# Patient Record
Sex: FEMALE | HISPANIC OR LATINO | URBAN - METROPOLITAN AREA
[De-identification: names, ages, dates, MRNs, and addresses within clinical notes are randomized per-mention and may not be internally consistent; named-entity substitution may affect disease eponyms.]

---

## 2024-08-28 ENCOUNTER — TELEPHONE ENCOUNTER (OUTPATIENT)
Dept: URBAN - METROPOLITAN AREA CLINIC 60 | Facility: CLINIC | Age: 69
End: 2024-08-28

## 2024-08-28 ENCOUNTER — OFFICE VISIT (OUTPATIENT)
Dept: URBAN - METROPOLITAN AREA CLINIC 60 | Facility: CLINIC | Age: 69
End: 2024-08-28
Payer: MEDICARE

## 2024-08-28 ENCOUNTER — DASHBOARD ENCOUNTERS (OUTPATIENT)
Age: 69
End: 2024-08-28

## 2024-08-28 VITALS
HEIGHT: 64 IN | WEIGHT: 198 LBS | HEART RATE: 80 BPM | OXYGEN SATURATION: 96 % | RESPIRATION RATE: 20 BRPM | TEMPERATURE: 97.5 F | DIASTOLIC BLOOD PRESSURE: 70 MMHG | BODY MASS INDEX: 33.8 KG/M2 | SYSTOLIC BLOOD PRESSURE: 120 MMHG

## 2024-08-28 DIAGNOSIS — K31.A0 GASTRIC INTESTINAL METAPLASIA: ICD-10-CM

## 2024-08-28 DIAGNOSIS — Z12.11 COLON CANCER SCREENING: ICD-10-CM

## 2024-08-28 DIAGNOSIS — K21.9 GASTROESOPHAGEAL REFLUX DISEASE WITHOUT ESOPHAGITIS: ICD-10-CM

## 2024-08-28 PROBLEM — 266435005: Status: ACTIVE | Noted: 2024-08-28

## 2024-08-28 PROCEDURE — 99204 OFFICE O/P NEW MOD 45 MIN: CPT | Performed by: NURSE PRACTITIONER

## 2024-08-28 RX ORDER — LOSARTAN POTASSIUM 50 MG/1
TAKE 1 TABLET BY MOUTH DAILY IN THE MORNING TABLET, FILM COATED ORAL
Qty: 90 EACH | Refills: 0 | Status: ACTIVE | COMMUNITY

## 2024-08-28 RX ORDER — METFORMIN HYDROCHLORIDE 500 MG/1
TAKE 1 TABLET BY MOUTH TWICE DAILY WITH MAIN MEALS TABLET, FILM COATED ORAL
Qty: 180 EACH | Refills: 0 | Status: ACTIVE | COMMUNITY

## 2024-08-28 RX ORDER — PREDNISONE 5 MG/1
TABLET ORAL
Qty: 70 EACH | Refills: 0 | Status: ACTIVE | COMMUNITY

## 2024-08-28 RX ORDER — DICLOFENAC SODIUM 10 MG/G
GEL TOPICAL
Qty: 200 GRAM | Status: ACTIVE | COMMUNITY

## 2024-08-28 RX ORDER — TRAZODONE HYDROCHLORIDE 50 MG/1
TABLET ORAL
Qty: 90 TABLET | Status: ACTIVE | COMMUNITY

## 2024-08-28 RX ORDER — GABAPENTIN 300 MG/1
TAKE 1 CAPSULE BY MOUTH AT BEDTIME CAPSULE ORAL
Qty: 90 EACH | Refills: 0 | Status: ACTIVE | COMMUNITY

## 2024-08-28 RX ORDER — FOLIC ACID 1 MG/1
TABLET ORAL
Qty: 90 TABLET | Status: ACTIVE | COMMUNITY

## 2024-08-28 RX ORDER — ETANERCEPT 50 MG/ML
SOLUTION SUBCUTANEOUS
Qty: 4 MILLILITER | Status: ACTIVE | COMMUNITY

## 2024-08-28 RX ORDER — MECLIZINE HYDROCHLORIDE 25 MG/1
TABLET ORAL
Qty: 30 TABLET | Status: ACTIVE | COMMUNITY

## 2024-08-28 RX ORDER — OMEPRAZOLE 20 MG/1
1 CAPSULE 30 MINUTES BEFORE MORNING MEAL CAPSULE, DELAYED RELEASE ORAL ONCE A DAY
Qty: 90 CAPSULES | Refills: 0 | OUTPATIENT
Start: 2024-08-28

## 2024-08-28 RX ORDER — TIZANIDINE 4 MG/1
TABLET ORAL
Qty: 90 TABLET | Status: ACTIVE | COMMUNITY

## 2024-08-28 RX ORDER — ATORVASTATIN CALCIUM 10 MG/1
TAKE 1 TABLET BY MOUTH DAILY TABLET, FILM COATED ORAL
Qty: 90 EACH | Refills: 0 | Status: ACTIVE | COMMUNITY

## 2024-08-28 RX ORDER — IBUPROFEN 600 MG/1
TABLET, FILM COATED ORAL
Qty: 30 TABLET | Status: ACTIVE | COMMUNITY

## 2024-08-28 RX ORDER — ENALAPRIL MALEATE 20 MG/1
TAKE 1 TABLET BY MOUTH EVERY DAY TABLET ORAL
Qty: 90 EACH | Refills: 0 | Status: ACTIVE | COMMUNITY

## 2024-08-28 RX ORDER — SERTRALINE HYDROCHLORIDE 25 MG/1
TAKE 1 TABLET BY MOUTH DAILY IN THE MORNING TABLET, FILM COATED ORAL
Qty: 90 EACH | Refills: 0 | Status: ACTIVE | COMMUNITY

## 2024-08-28 RX ORDER — TRIAMCINOLONE ACETONIDE 1 MG/G
APPLY TOPICALLY TO AFFECTED AREA TWICE DAILY CREAM TOPICAL
Qty: 454 GRAM | Refills: 0 | Status: ACTIVE | COMMUNITY

## 2024-08-28 RX ORDER — OXYCODONE HYDROCHLORIDE AND ACETAMINOPHEN 5; 325 MG/1; MG/1
TABLET ORAL
Qty: 10 EACH | Refills: 0 | Status: ACTIVE | COMMUNITY

## 2024-08-28 NOTE — HPI-TODAY'S VISIT:
8/24 Patient is here today with general state.  She is here for her surveillance EGD and colonoscopy.  Patient last colonoscopy was done on 2013 and also her last EGD.  She has medical history of gastric intestinal metaplasia, negative for colon polyps, patient denies any personal family history of colorectal cancer, rectal bleeding or change in her bowel habits. Patient will resume diet for gastritis and reflux, omeprazole 20 mg daily, and  EGD. Colonoscopy Mustarde Flap Text: The defect edges were debeveled with a #15 scalpel blade.  Given the size, depth and location of the defect and the proximity to free margins a Mustarde flap was deemed most appropriate.  Using a sterile surgical marker, an appropriate flap was drawn incorporating the defect. The area thus outlined was incised with a #15 scalpel blade.  The skin margins were undermined to an appropriate distance in all directions utilizing iris scissors.

## 2024-09-04 ENCOUNTER — LAB OUTSIDE AN ENCOUNTER (OUTPATIENT)
Dept: URBAN - METROPOLITAN AREA CLINIC 60 | Facility: CLINIC | Age: 69
End: 2024-09-04

## 2024-09-05 ENCOUNTER — OFFICE VISIT (OUTPATIENT)
Dept: URBAN - METROPOLITAN AREA CLINIC 63 | Facility: CLINIC | Age: 69
End: 2024-09-05

## 2024-11-11 ENCOUNTER — TELEPHONE ENCOUNTER (OUTPATIENT)
Dept: URBAN - METROPOLITAN AREA CLINIC 60 | Facility: CLINIC | Age: 69
End: 2024-11-11

## 2024-11-14 ENCOUNTER — CLAIMS CREATED FROM THE CLAIM WINDOW (OUTPATIENT)
Dept: URBAN - METROPOLITAN AREA SURGERY CENTER 4 | Facility: SURGERY CENTER | Age: 69
End: 2024-11-14
Payer: COMMERCIAL

## 2024-11-14 ENCOUNTER — CLAIMS CREATED FROM THE CLAIM WINDOW (OUTPATIENT)
Dept: URBAN - METROPOLITAN AREA CLINIC 4 | Facility: CLINIC | Age: 69
End: 2024-11-14
Payer: COMMERCIAL

## 2024-11-14 DIAGNOSIS — K29.60 OTHER GASTRITIS WITHOUT BLEEDING: ICD-10-CM

## 2024-11-14 DIAGNOSIS — K22.89 OTHER SPECIFIED DISEASE OF ESOPHAGUS: ICD-10-CM

## 2024-11-14 DIAGNOSIS — Z12.11 ENCOUNTER FOR SCREENING FOR MALIGNANT NEOPLASM OF COLON: ICD-10-CM

## 2024-11-14 DIAGNOSIS — B96.81 HELICOBACTER PYLORI [H. PYLORI] AS THE CAUSE OF DISEASES CLASSIFIED ELSEWHERE: ICD-10-CM

## 2024-11-14 DIAGNOSIS — K31.89 OTHER DISEASES OF STOMACH AND DUODENUM: ICD-10-CM

## 2024-11-14 DIAGNOSIS — K44.9 DIAPHRAGMATIC HERNIA WITHOUT OBSTRUCTION OR GANGRENE: ICD-10-CM

## 2024-11-14 DIAGNOSIS — K44.9 HIATAL HERNIA: ICD-10-CM

## 2024-11-14 DIAGNOSIS — K64.1 SECOND DEGREE HEMORRHOIDS: ICD-10-CM

## 2024-11-14 DIAGNOSIS — K64.1 GRADE II INTERNAL HEMORRHOIDS: ICD-10-CM

## 2024-11-14 DIAGNOSIS — K29.70 GASTRITIS WITHOUT BLEEDING, UNSPECIFIED CHRONICITY, UNSPECIFIED GASTRITIS TYPE: ICD-10-CM

## 2024-11-14 DIAGNOSIS — K29.70 ERYTHEMATOUS GASTROPATHY: ICD-10-CM

## 2024-11-14 DIAGNOSIS — K21.9 GASTRO-ESOPHAGEAL REFLUX DISEASE WITHOUT ESOPHAGITIS: ICD-10-CM

## 2024-11-14 DIAGNOSIS — K57.30 DIVERTICULOSIS OF LARGE INTESTINE WITHOUT PERFORATION OR ABSCESS WITHOUT BLEEDING: ICD-10-CM

## 2024-11-14 PROCEDURE — 0528F RCMND FLW-UP 10 YRS DOCD: CPT | Performed by: INTERNAL MEDICINE

## 2024-11-14 PROCEDURE — 88312 SPECIAL STAINS GROUP 1: CPT | Performed by: PATHOLOGY

## 2024-11-14 PROCEDURE — 43239 EGD BIOPSY SINGLE/MULTIPLE: CPT | Performed by: INTERNAL MEDICINE

## 2024-11-14 PROCEDURE — 00813 ANES UPR LWR GI NDSC PX: CPT | Performed by: NURSE ANESTHETIST, CERTIFIED REGISTERED

## 2024-11-14 PROCEDURE — 88305 TISSUE EXAM BY PATHOLOGIST: CPT | Performed by: PATHOLOGY

## 2024-11-14 PROCEDURE — G0121 COLON CA SCRN NOT HI RSK IND: HCPCS | Performed by: INTERNAL MEDICINE

## 2024-11-14 RX ORDER — PREDNISONE 5 MG/1
TABLET ORAL
Qty: 70 EACH | Refills: 0 | Status: ACTIVE | COMMUNITY

## 2024-11-14 RX ORDER — OXYCODONE HYDROCHLORIDE AND ACETAMINOPHEN 5; 325 MG/1; MG/1
TABLET ORAL
Qty: 10 EACH | Refills: 0 | Status: ACTIVE | COMMUNITY

## 2024-11-14 RX ORDER — SERTRALINE HYDROCHLORIDE 25 MG/1
TAKE 1 TABLET BY MOUTH DAILY IN THE MORNING TABLET, FILM COATED ORAL
Qty: 90 EACH | Refills: 0 | Status: ACTIVE | COMMUNITY

## 2024-11-14 RX ORDER — METFORMIN HYDROCHLORIDE 500 MG/1
TAKE 1 TABLET BY MOUTH TWICE DAILY WITH MAIN MEALS TABLET, FILM COATED ORAL
Qty: 180 EACH | Refills: 0 | Status: ACTIVE | COMMUNITY

## 2024-11-14 RX ORDER — DICLOFENAC SODIUM 10 MG/G
GEL TOPICAL
Qty: 200 GRAM | Status: ACTIVE | COMMUNITY

## 2024-11-14 RX ORDER — GABAPENTIN 300 MG/1
TAKE 1 CAPSULE BY MOUTH AT BEDTIME CAPSULE ORAL
Qty: 90 EACH | Refills: 0 | Status: ACTIVE | COMMUNITY

## 2024-11-14 RX ORDER — IBUPROFEN 600 MG/1
TABLET, FILM COATED ORAL
Qty: 30 TABLET | Status: ACTIVE | COMMUNITY

## 2024-11-14 RX ORDER — TRAZODONE HYDROCHLORIDE 50 MG/1
TABLET ORAL
Qty: 90 TABLET | Status: ACTIVE | COMMUNITY

## 2024-11-14 RX ORDER — MECLIZINE HYDROCHLORIDE 25 MG/1
TABLET ORAL
Qty: 30 TABLET | Status: ACTIVE | COMMUNITY

## 2024-11-14 RX ORDER — ETANERCEPT 50 MG/ML
SOLUTION SUBCUTANEOUS
Qty: 4 MILLILITER | Status: ACTIVE | COMMUNITY

## 2024-11-14 RX ORDER — FOLIC ACID 1 MG/1
TABLET ORAL
Qty: 90 TABLET | Status: ACTIVE | COMMUNITY

## 2024-11-14 RX ORDER — TIZANIDINE 4 MG/1
TABLET ORAL
Qty: 90 TABLET | Status: ACTIVE | COMMUNITY

## 2024-11-14 RX ORDER — ENALAPRIL MALEATE 20 MG/1
TAKE 1 TABLET BY MOUTH EVERY DAY TABLET ORAL
Qty: 90 EACH | Refills: 0 | Status: ACTIVE | COMMUNITY

## 2024-11-14 RX ORDER — TRIAMCINOLONE ACETONIDE 1 MG/G
APPLY TOPICALLY TO AFFECTED AREA TWICE DAILY CREAM TOPICAL
Qty: 454 GRAM | Refills: 0 | Status: ACTIVE | COMMUNITY

## 2024-11-14 RX ORDER — LOSARTAN POTASSIUM 50 MG/1
TAKE 1 TABLET BY MOUTH DAILY IN THE MORNING TABLET, FILM COATED ORAL
Qty: 90 EACH | Refills: 0 | Status: ACTIVE | COMMUNITY

## 2024-11-14 RX ORDER — OMEPRAZOLE 20 MG/1
1 CAPSULE 30 MINUTES BEFORE MORNING MEAL CAPSULE, DELAYED RELEASE ORAL ONCE A DAY
Qty: 90 CAPSULES | Refills: 0 | Status: ACTIVE | COMMUNITY
Start: 2024-08-28

## 2024-11-14 RX ORDER — ATORVASTATIN CALCIUM 10 MG/1
TAKE 1 TABLET BY MOUTH DAILY TABLET, FILM COATED ORAL
Qty: 90 EACH | Refills: 0 | Status: ACTIVE | COMMUNITY

## 2024-11-24 ENCOUNTER — ERX REFILL RESPONSE (OUTPATIENT)
Dept: URBAN - METROPOLITAN AREA CLINIC 60 | Facility: CLINIC | Age: 69
End: 2024-11-24

## 2024-11-24 RX ORDER — OMEPRAZOLE 20 MG/1
1 CAPSULE 30 MINUTES BEFORE MORNING MEAL CAPSULE, DELAYED RELEASE ORAL ONCE A DAY
Qty: 90 CAPSULES | Refills: 0 | OUTPATIENT

## 2024-11-24 RX ORDER — OMEPRAZOLE 20 MG/1
TAKE 1 CAPSULE BY MOUTH DAILY 30 MINUTES BEFORE BREAKFAST CAPSULE, DELAYED RELEASE ORAL
Qty: 90 CAPSULE | Refills: 0 | OUTPATIENT

## 2024-11-27 ENCOUNTER — OFFICE VISIT (OUTPATIENT)
Dept: URBAN - METROPOLITAN AREA CLINIC 60 | Facility: CLINIC | Age: 69
End: 2024-11-27

## 2024-12-05 ENCOUNTER — OFFICE VISIT (OUTPATIENT)
Dept: URBAN - METROPOLITAN AREA CLINIC 60 | Facility: CLINIC | Age: 69
End: 2024-12-05
Payer: COMMERCIAL

## 2024-12-05 VITALS
BODY MASS INDEX: 33.63 KG/M2 | RESPIRATION RATE: 20 BRPM | WEIGHT: 197 LBS | DIASTOLIC BLOOD PRESSURE: 78 MMHG | HEIGHT: 64 IN | OXYGEN SATURATION: 97 % | HEART RATE: 73 BPM | SYSTOLIC BLOOD PRESSURE: 120 MMHG | TEMPERATURE: 97.7 F

## 2024-12-05 DIAGNOSIS — K57.90 DIVERTICULOSIS: ICD-10-CM

## 2024-12-05 DIAGNOSIS — B96.81 HELICOBACTER PYLORI [H. PYLORI] AS THE CAUSE OF DISEASES CLASSIFIED ELSEWHERE: ICD-10-CM

## 2024-12-05 DIAGNOSIS — K29.70 CHRONIC ACITVE GASTRITIS (H.PYLORI NEGATIVE): ICD-10-CM

## 2024-12-05 DIAGNOSIS — K64.1 GRADE II HEMORRHOIDS: ICD-10-CM

## 2024-12-05 DIAGNOSIS — K21.9 ACID REFLUX: ICD-10-CM

## 2024-12-05 DIAGNOSIS — K44.9 HIATAL HERNIA: ICD-10-CM

## 2024-12-05 PROBLEM — 708164002: Status: ACTIVE | Noted: 2024-12-05

## 2024-12-05 PROBLEM — 235595009: Status: ACTIVE | Noted: 2024-12-05

## 2024-12-05 PROBLEM — 397881000: Status: ACTIVE | Noted: 2024-12-05

## 2024-12-05 PROBLEM — 721704005: Status: ACTIVE | Noted: 2024-12-05

## 2024-12-05 PROCEDURE — 99214 OFFICE O/P EST MOD 30 MIN: CPT | Performed by: NURSE PRACTITIONER

## 2024-12-05 RX ORDER — TRIAMCINOLONE ACETONIDE 1 MG/G
APPLY TOPICALLY TO AFFECTED AREA TWICE DAILY CREAM TOPICAL
Qty: 454 GRAM | Refills: 0 | Status: ACTIVE | COMMUNITY

## 2024-12-05 RX ORDER — ETANERCEPT 50 MG/ML
SOLUTION SUBCUTANEOUS
Qty: 4 MILLILITER | Status: ACTIVE | COMMUNITY

## 2024-12-05 RX ORDER — GABAPENTIN 300 MG/1
TAKE 1 CAPSULE BY MOUTH AT BEDTIME CAPSULE ORAL
Qty: 90 EACH | Refills: 0 | Status: ACTIVE | COMMUNITY

## 2024-12-05 RX ORDER — TRAZODONE HYDROCHLORIDE 50 MG/1
TABLET ORAL
Qty: 90 TABLET | Status: ACTIVE | COMMUNITY

## 2024-12-05 RX ORDER — DICLOFENAC SODIUM 10 MG/G
GEL TOPICAL
Qty: 200 GRAM | Status: ACTIVE | COMMUNITY

## 2024-12-05 RX ORDER — LOSARTAN POTASSIUM 50 MG/1
TAKE 1 TABLET BY MOUTH DAILY IN THE MORNING TABLET, FILM COATED ORAL
Qty: 90 EACH | Refills: 0 | Status: ACTIVE | COMMUNITY

## 2024-12-05 RX ORDER — PREDNISONE 5 MG/1
TABLET ORAL
Qty: 70 EACH | Refills: 0 | Status: ACTIVE | COMMUNITY

## 2024-12-05 RX ORDER — AMOXICILLIN 500 MG/1
2 CAPSULES CAPSULE ORAL
Qty: 56 CAPSULES | Refills: 0 | OUTPATIENT
Start: 2024-12-05 | End: 2024-12-19

## 2024-12-05 RX ORDER — TIZANIDINE 4 MG/1
TABLET ORAL
Qty: 90 TABLET | Status: ACTIVE | COMMUNITY

## 2024-12-05 RX ORDER — IBUPROFEN 600 MG/1
TABLET, FILM COATED ORAL
Qty: 30 TABLET | Status: ACTIVE | COMMUNITY

## 2024-12-05 RX ORDER — CLARITHROMYCIN 500 MG/1
1 TABLET TABLET, FILM COATED ORAL
Qty: 28 TABLET | Refills: 0 | OUTPATIENT
Start: 2024-12-05 | End: 2024-12-19

## 2024-12-05 RX ORDER — MECLIZINE HYDROCHLORIDE 25 MG/1
TABLET ORAL
Qty: 30 TABLET | Status: ACTIVE | COMMUNITY

## 2024-12-05 RX ORDER — METFORMIN HYDROCHLORIDE 500 MG/1
TAKE 1 TABLET BY MOUTH TWICE DAILY WITH MAIN MEALS TABLET, FILM COATED ORAL
Qty: 180 EACH | Refills: 0 | Status: ACTIVE | COMMUNITY

## 2024-12-05 RX ORDER — ENALAPRIL MALEATE 20 MG/1
TAKE 1 TABLET BY MOUTH EVERY DAY TABLET ORAL
Qty: 90 EACH | Refills: 0 | Status: ACTIVE | COMMUNITY

## 2024-12-05 RX ORDER — OXYCODONE HYDROCHLORIDE AND ACETAMINOPHEN 5; 325 MG/1; MG/1
TABLET ORAL
Qty: 10 EACH | Refills: 0 | Status: ACTIVE | COMMUNITY

## 2024-12-05 RX ORDER — SERTRALINE HYDROCHLORIDE 25 MG/1
TAKE 1 TABLET BY MOUTH DAILY IN THE MORNING TABLET, FILM COATED ORAL
Qty: 90 EACH | Refills: 0 | Status: ACTIVE | COMMUNITY

## 2024-12-05 RX ORDER — ATORVASTATIN CALCIUM 10 MG/1
TAKE 1 TABLET BY MOUTH DAILY TABLET, FILM COATED ORAL
Qty: 90 EACH | Refills: 0 | Status: ACTIVE | COMMUNITY

## 2024-12-05 RX ORDER — FOLIC ACID 1 MG/1
TABLET ORAL
Qty: 90 TABLET | Status: ACTIVE | COMMUNITY

## 2024-12-05 RX ORDER — OMEPRAZOLE 20 MG/1
TAKE 1 CAPSULE BY MOUTH DAILY 30 MINUTES BEFORE BREAKFAST CAPSULE, DELAYED RELEASE ORAL
Qty: 90 CAPSULE | Refills: 0 | Status: ACTIVE | COMMUNITY

## 2024-12-05 RX ORDER — OMEPRAZOLE 20 MG/1
TAKE ONE CAPSULE CAPSULE, DELAYED RELEASE ORAL
Qty: 60 CAPSULES | Refills: 0 | OUTPATIENT
Start: 2024-12-05

## 2024-12-05 NOTE — PHYSICAL EXAM GASTROINTESTINAL
Abdomen: Soft, epigastric tenderness, none distended, no guarding or rigidity, no masses palpable, normal bowel sounds, no hepatosplenomegaly.

## 2024-12-05 NOTE — HPI-TODAY'S VISIT:
8/24 Patient is here today with general state.  She is here for her surveillance EGD and colonoscopy.  Patient last colonoscopy was done on 2013 and also her last EGD.  She has medical history of gastric intestinal metaplasia, negative for colon polyps, patient denies any personal family history of colorectal cancer, rectal bleeding or change in her bowel habits. Patient will resume diet for gastritis and reflux, omeprazole 20 mg daily, and  EGD. Colonoscopy  12/24 Patient's colonoscopy shows evidence of diverticular disease of the entire examined colon, internal hemorrhoids, and no specimens were collected. Next colonoscopy will be in 5 year.  EGD demonstrating small hiatal hernia, nonerosive gastritis, esophageal mucosal changes suspicious for short segment of Lerma esophagus, and normal duodenum.  Biopsy results show evidence of duodenal mucosa with no significant abnormality.  Stomach, antrum, body biopsy shows evidence of chronic Helicobacter gastritis, H. pylori organisms were identified.  No evidence of gastric intestinal metaplasia. Lower  Third esophageal mucosa with reflux type changes.  Negative for Lerma esophagus, eosinophilic esophagitis, dysplasia, malignancy.

## 2024-12-09 ENCOUNTER — ERX REFILL RESPONSE (OUTPATIENT)
Dept: URBAN - METROPOLITAN AREA CLINIC 60 | Facility: CLINIC | Age: 69
End: 2024-12-09

## 2024-12-09 RX ORDER — OMEPRAZOLE 20 MG/1
TAKE ONE CAPSULE CAPSULE, DELAYED RELEASE ORAL
Qty: 60 CAPSULES | Refills: 0 | OUTPATIENT

## 2024-12-09 RX ORDER — OMEPRAZOLE 20 MG/1
TAKE 1 CAPSULE BY MOUTH TWICE DAILY CAPSULE, DELAYED RELEASE ORAL
Qty: 180 CAPSULE | Refills: 0 | OUTPATIENT

## 2025-01-24 ENCOUNTER — OFFICE VISIT (OUTPATIENT)
Dept: URBAN - METROPOLITAN AREA CLINIC 60 | Facility: CLINIC | Age: 70
End: 2025-01-24

## 2025-01-30 ENCOUNTER — LAB OUTSIDE AN ENCOUNTER (OUTPATIENT)
Dept: URBAN - METROPOLITAN AREA CLINIC 60 | Facility: CLINIC | Age: 70
End: 2025-01-30

## 2025-01-30 ENCOUNTER — OFFICE VISIT (OUTPATIENT)
Dept: URBAN - METROPOLITAN AREA CLINIC 60 | Facility: CLINIC | Age: 70
End: 2025-01-30
Payer: COMMERCIAL

## 2025-01-30 VITALS
SYSTOLIC BLOOD PRESSURE: 120 MMHG | DIASTOLIC BLOOD PRESSURE: 80 MMHG | OXYGEN SATURATION: 98 % | WEIGHT: 197 LBS | HEIGHT: 64 IN | RESPIRATION RATE: 20 BRPM | TEMPERATURE: 97.7 F | HEART RATE: 88 BPM | BODY MASS INDEX: 33.63 KG/M2

## 2025-01-30 DIAGNOSIS — R13.14 PHARYNGOESOPHAGEAL DYSPHAGIA: ICD-10-CM

## 2025-01-30 DIAGNOSIS — K44.9 HIATAL HERNIA: ICD-10-CM

## 2025-01-30 DIAGNOSIS — K29.70 GASTRITIS, UNSPECIFIED, WITHOUT BLEEDING: ICD-10-CM

## 2025-01-30 DIAGNOSIS — B96.81 HELICOBACTER PYLORI [H. PYLORI] AS THE CAUSE OF DISEASES CLASSIFIED ELSEWHERE: ICD-10-CM

## 2025-01-30 DIAGNOSIS — K21.9 GASTROESOPHAGEAL REFLUX DISEASE WITHOUT ESOPHAGITIS: ICD-10-CM

## 2025-01-30 PROBLEM — 40739000: Status: ACTIVE | Noted: 2025-01-30

## 2025-01-30 PROCEDURE — 99214 OFFICE O/P EST MOD 30 MIN: CPT | Performed by: NURSE PRACTITIONER

## 2025-01-30 RX ORDER — OMEPRAZOLE 20 MG/1
TAKE 1 CAPSULE BY MOUTH TWICE DAILY CAPSULE, DELAYED RELEASE ORAL
Qty: 180 CAPSULE | Refills: 0 | OUTPATIENT

## 2025-01-30 RX ORDER — TRIAMCINOLONE ACETONIDE 1 MG/G
APPLY TOPICALLY TO AFFECTED AREA TWICE DAILY CREAM TOPICAL
Qty: 454 GRAM | Refills: 0 | Status: ACTIVE | COMMUNITY

## 2025-01-30 RX ORDER — TIZANIDINE 4 MG/1
TABLET ORAL
Qty: 90 TABLET | Status: ACTIVE | COMMUNITY

## 2025-01-30 RX ORDER — PREDNISONE 5 MG/1
TABLET ORAL
Qty: 70 EACH | Refills: 0 | Status: ACTIVE | COMMUNITY

## 2025-01-30 RX ORDER — ENALAPRIL MALEATE 20 MG/1
TAKE 1 TABLET BY MOUTH EVERY DAY TABLET ORAL
Qty: 90 EACH | Refills: 0 | Status: ACTIVE | COMMUNITY

## 2025-01-30 RX ORDER — ATORVASTATIN CALCIUM 10 MG/1
TAKE 1 TABLET BY MOUTH DAILY TABLET, FILM COATED ORAL
Qty: 90 EACH | Refills: 0 | Status: ACTIVE | COMMUNITY

## 2025-01-30 RX ORDER — OXYCODONE HYDROCHLORIDE AND ACETAMINOPHEN 5; 325 MG/1; MG/1
TABLET ORAL
Qty: 10 EACH | Refills: 0 | Status: ACTIVE | COMMUNITY

## 2025-01-30 RX ORDER — IBUPROFEN 600 MG/1
TABLET, FILM COATED ORAL
Qty: 30 TABLET | Status: ACTIVE | COMMUNITY

## 2025-01-30 RX ORDER — TRAZODONE HYDROCHLORIDE 50 MG/1
TABLET ORAL
Qty: 90 TABLET | Status: ACTIVE | COMMUNITY

## 2025-01-30 RX ORDER — GABAPENTIN 300 MG/1
TAKE 1 CAPSULE BY MOUTH AT BEDTIME CAPSULE ORAL
Qty: 90 EACH | Refills: 0 | Status: ACTIVE | COMMUNITY

## 2025-01-30 RX ORDER — DICLOFENAC SODIUM 10 MG/G
GEL TOPICAL
Qty: 200 GRAM | Status: ACTIVE | COMMUNITY

## 2025-01-30 RX ORDER — METFORMIN HYDROCHLORIDE 500 MG/1
TAKE 1 TABLET BY MOUTH TWICE DAILY WITH MAIN MEALS TABLET, FILM COATED ORAL
Qty: 180 EACH | Refills: 0 | Status: ACTIVE | COMMUNITY

## 2025-01-30 RX ORDER — OMEPRAZOLE 20 MG/1
TAKE 1 CAPSULE BY MOUTH TWICE DAILY CAPSULE, DELAYED RELEASE ORAL
Qty: 180 CAPSULE | Refills: 0 | Status: ACTIVE | COMMUNITY

## 2025-01-30 RX ORDER — FOLIC ACID 1 MG/1
TABLET ORAL
Qty: 90 TABLET | Status: ACTIVE | COMMUNITY

## 2025-01-30 RX ORDER — ETANERCEPT 50 MG/ML
SOLUTION SUBCUTANEOUS
Qty: 4 MILLILITER | Status: ACTIVE | COMMUNITY

## 2025-01-30 RX ORDER — LOSARTAN POTASSIUM 50 MG/1
TAKE 1 TABLET BY MOUTH DAILY IN THE MORNING TABLET, FILM COATED ORAL
Qty: 90 EACH | Refills: 0 | Status: ACTIVE | COMMUNITY

## 2025-01-30 RX ORDER — MECLIZINE HYDROCHLORIDE 25 MG/1
TABLET ORAL
Qty: 30 TABLET | Status: ACTIVE | COMMUNITY

## 2025-01-30 RX ORDER — SERTRALINE HYDROCHLORIDE 25 MG/1
TAKE 1 TABLET BY MOUTH DAILY IN THE MORNING TABLET, FILM COATED ORAL
Qty: 90 EACH | Refills: 0 | Status: ACTIVE | COMMUNITY

## 2025-01-30 NOTE — HPI-TODAY'S VISIT:
8/24 Patient is here today with general state.  She is here for her surveillance EGD and colonoscopy.  Patient last colonoscopy was done on 2013 and also her last EGD.  She has medical history of gastric intestinal metaplasia, negative for colon polyps, patient denies any personal family history of colorectal cancer, rectal bleeding or change in her bowel habits. Patient will resume diet for gastritis and reflux, omeprazole 20 mg daily, and  EGD. Colonoscopy  12/24 Patient's colonoscopy shows evidence of diverticular disease of the entire examined colon, internal hemorrhoids, and no specimens were collected. Next colonoscopy will be in 5 year.  EGD demonstrating small hiatal hernia, nonerosive gastritis, esophageal mucosal changes suspicious for short segment of Lerma esophagus, and normal duodenum.  Biopsy results show evidence of duodenal mucosa with no significant abnormality.  Stomach, antrum, body biopsy shows evidence of chronic Helicobacter gastritis, H. pylori organisms were identified.  No evidence of gastric intestinal metaplasia. Lower  Third esophageal mucosa with reflux type changes.  Negative for Lerma esophagus, eosinophilic esophagitis, dysplasia, malignancy.  01/25 Patient is here today in good general state.  She said that she did finish her treatment for H. pylori, her symptoms of gastritis and reflux are better, but still experiences symptoms mostly meal related.  Today she is complaining of having dysphagia, negative for hematemesis or odynophagia.  Patient will continue with treatment for GERD and will have a laboratory to check on persistency of H. pylori organism.  Swallow evaluation under fluoroscopy and barium swallow.  Diet for dysphagia was discussed with her.

## 2025-03-16 ENCOUNTER — ERX REFILL RESPONSE (OUTPATIENT)
Dept: URBAN - METROPOLITAN AREA CLINIC 60 | Facility: CLINIC | Age: 70
End: 2025-03-16

## 2025-03-16 RX ORDER — OMEPRAZOLE 20 MG/1
TAKE 1 CAPSULE BY MOUTH TWICE DAILY CAPSULE, DELAYED RELEASE ORAL
Qty: 180 CAPSULE | Refills: 0 | OUTPATIENT

## 2025-03-17 ENCOUNTER — TELEPHONE ENCOUNTER (OUTPATIENT)
Dept: URBAN - METROPOLITAN AREA CLINIC 63 | Facility: CLINIC | Age: 70
End: 2025-03-17

## 2025-03-17 RX ORDER — OMEPRAZOLE 20 MG/1
TAKE 1 CAPSULE BY MOUTH TWICE DAILY CAPSULE, DELAYED RELEASE ORAL ONCE A DAY
Qty: 90 | Refills: 0

## 2025-03-18 ENCOUNTER — ERX REFILL RESPONSE (OUTPATIENT)
Dept: URBAN - METROPOLITAN AREA CLINIC 63 | Facility: CLINIC | Age: 70
End: 2025-03-18

## 2025-03-18 RX ORDER — OMEPRAZOLE 20 MG/1
TAKE 1 CAPSULE BY MOUTH TWICE DAILY CAPSULE, DELAYED RELEASE ORAL ONCE A DAY
Qty: 90 | Refills: 0 | OUTPATIENT

## 2025-03-18 RX ORDER — OMEPRAZOLE 20 MG/1
TAKE 1 CAPSULE BY MOUTH TWICE DAILY CAPSULE, DELAYED RELEASE ORAL
Qty: 180 CAPSULE | Refills: 0 | OUTPATIENT

## 2025-03-19 ENCOUNTER — OFFICE VISIT (OUTPATIENT)
Dept: URBAN - METROPOLITAN AREA CLINIC 60 | Facility: CLINIC | Age: 70
End: 2025-03-19
Payer: COMMERCIAL

## 2025-03-19 VITALS
OXYGEN SATURATION: 96 % | HEART RATE: 90 BPM | RESPIRATION RATE: 20 BRPM | BODY MASS INDEX: 33.12 KG/M2 | TEMPERATURE: 97.9 F | HEIGHT: 64 IN | DIASTOLIC BLOOD PRESSURE: 80 MMHG | WEIGHT: 194 LBS | SYSTOLIC BLOOD PRESSURE: 130 MMHG

## 2025-03-19 DIAGNOSIS — K21.9 ACID REFLUX: ICD-10-CM

## 2025-03-19 DIAGNOSIS — R13.19 OTHER DYSPHAGIA: ICD-10-CM

## 2025-03-19 DIAGNOSIS — K44.9 HIATAL HERNIA: ICD-10-CM

## 2025-03-19 PROCEDURE — 99214 OFFICE O/P EST MOD 30 MIN: CPT | Performed by: NURSE PRACTITIONER

## 2025-03-19 RX ORDER — FOLIC ACID 1 MG/1
TABLET ORAL
Qty: 90 TABLET | Status: ACTIVE | COMMUNITY

## 2025-03-19 RX ORDER — OMEPRAZOLE 20 MG/1
TAKE 1 CAPSULE BY MOUTH TWICE DAILY CAPSULE, DELAYED RELEASE ORAL ONCE A DAY
Qty: 90 CAPSULES | Refills: 0 | OUTPATIENT

## 2025-03-19 RX ORDER — IBUPROFEN 600 MG/1
TABLET, FILM COATED ORAL
Qty: 30 TABLET | Status: ACTIVE | COMMUNITY

## 2025-03-19 RX ORDER — TIZANIDINE 4 MG/1
TABLET ORAL
Qty: 90 TABLET | Status: ACTIVE | COMMUNITY

## 2025-03-19 RX ORDER — ENALAPRIL MALEATE 20 MG/1
TAKE 1 TABLET BY MOUTH EVERY DAY TABLET ORAL
Qty: 90 EACH | Refills: 0 | Status: ACTIVE | COMMUNITY

## 2025-03-19 RX ORDER — GABAPENTIN 300 MG/1
TAKE 1 CAPSULE BY MOUTH AT BEDTIME CAPSULE ORAL
Qty: 90 EACH | Refills: 0 | Status: ACTIVE | COMMUNITY

## 2025-03-19 RX ORDER — ATORVASTATIN CALCIUM 10 MG/1
TAKE 1 TABLET BY MOUTH DAILY TABLET, FILM COATED ORAL
Qty: 90 EACH | Refills: 0 | Status: ACTIVE | COMMUNITY

## 2025-03-19 RX ORDER — PREDNISONE 5 MG/1
TABLET ORAL
Qty: 70 EACH | Refills: 0 | Status: ACTIVE | COMMUNITY

## 2025-03-19 RX ORDER — DICLOFENAC SODIUM 10 MG/G
GEL TOPICAL
Qty: 200 GRAM | Status: ACTIVE | COMMUNITY

## 2025-03-19 RX ORDER — ETANERCEPT 50 MG/ML
SOLUTION SUBCUTANEOUS
Qty: 4 MILLILITER | Status: ACTIVE | COMMUNITY

## 2025-03-19 RX ORDER — OXYCODONE HYDROCHLORIDE AND ACETAMINOPHEN 5; 325 MG/1; MG/1
TABLET ORAL
Qty: 10 EACH | Refills: 0 | Status: ACTIVE | COMMUNITY

## 2025-03-19 RX ORDER — OMEPRAZOLE 20 MG/1
TAKE 1 CAPSULE BY MOUTH TWICE DAILY CAPSULE, DELAYED RELEASE ORAL
Qty: 180 CAPSULE | Refills: 0 | Status: ACTIVE | COMMUNITY

## 2025-03-19 RX ORDER — SERTRALINE HYDROCHLORIDE 25 MG/1
TAKE 1 TABLET BY MOUTH DAILY IN THE MORNING TABLET, FILM COATED ORAL
Qty: 90 EACH | Refills: 0 | Status: ACTIVE | COMMUNITY

## 2025-03-19 RX ORDER — MECLIZINE HYDROCHLORIDE 25 MG/1
TABLET ORAL
Qty: 30 TABLET | Status: ACTIVE | COMMUNITY

## 2025-03-19 RX ORDER — TRAZODONE HYDROCHLORIDE 50 MG/1
TABLET ORAL
Qty: 90 TABLET | Status: ACTIVE | COMMUNITY

## 2025-03-19 RX ORDER — METFORMIN HYDROCHLORIDE 500 MG/1
TAKE 1 TABLET BY MOUTH TWICE DAILY WITH MAIN MEALS TABLET, FILM COATED ORAL
Qty: 180 EACH | Refills: 0 | Status: ACTIVE | COMMUNITY

## 2025-03-19 RX ORDER — LOSARTAN POTASSIUM 50 MG/1
TAKE 1 TABLET BY MOUTH DAILY IN THE MORNING TABLET, FILM COATED ORAL
Qty: 90 EACH | Refills: 0 | Status: ACTIVE | COMMUNITY

## 2025-03-19 RX ORDER — TRIAMCINOLONE ACETONIDE 1 MG/G
APPLY TOPICALLY TO AFFECTED AREA TWICE DAILY CREAM TOPICAL
Qty: 454 GRAM | Refills: 0 | Status: ACTIVE | COMMUNITY

## 2025-03-19 NOTE — HPI-TODAY'S VISIT:
8/24 Patient is here today with general state.  She is here for her surveillance EGD and colonoscopy.  Patient last colonoscopy was done on 2013 and also her last EGD.  She has medical history of gastric intestinal metaplasia, negative for colon polyps, patient denies any personal family history of colorectal cancer, rectal bleeding or change in her bowel habits. Patient will resume diet for gastritis and reflux, omeprazole 20 mg daily, and  EGD. Colonoscopy  12/24 Patient's colonoscopy shows evidence of diverticular disease of the entire examined colon, internal hemorrhoids, and no specimens were collected. Next colonoscopy will be in 5 year.  EGD demonstrating small hiatal hernia, nonerosive gastritis, esophageal mucosal changes suspicious for short segment of Lerma esophagus, and normal duodenum.  Biopsy results show evidence of duodenal mucosa with no significant abnormality.  Stomach, antrum, body biopsy shows evidence of chronic Helicobacter gastritis, H. pylori organisms were identified.  No evidence of gastric intestinal metaplasia. Lower  Third esophageal mucosa with reflux type changes.  Negative for Lerma esophagus, eosinophilic esophagitis, dysplasia, malignancy.  01/25 Patient is here today in good general state.  She said that she did finish her treatment for H. pylori, her symptoms of gastritis and reflux are better, but still experiences symptoms mostly meal related.  Today she is complaining of having dysphagia, negative for hematemesis or odynophagia.  Patient will continue with treatment for GERD and will have a laboratory to check on persistency of H. pylori organism. Swallow evaluation under fluoroscopy and barium swallow. Diet for dysphagia was discussed with her.  03/25 Patient here today in good general state.  She says symptom of dysphagia studies and reflux are better.  Barium swallow whole demonstrating a normal course and motility close distal follow-up, with no ulceration or mass.  Swallow evaluation under fluoroscopy shows evidence of oropharyngeal swallow function within normal limits.  Positive for flash trace penetration to the level of the mid laryngeal vestibule was also present during the swallowing with thin liquid.  Negative for aspiration. Patient will do diet for dysphagia continue with diet and treatment for GERD.

## 2025-03-19 NOTE — PHYSICAL EXAM CHEST:
No lesions,  no deformities, chest wall non-tender,  no masses present, breathing is unlabored without, normal breath sounds.
54

## 2025-03-28 ENCOUNTER — OFFICE VISIT (OUTPATIENT)
Dept: URBAN - METROPOLITAN AREA CLINIC 60 | Facility: CLINIC | Age: 70
End: 2025-03-28

## 2025-03-28 ENCOUNTER — ERX REFILL RESPONSE (OUTPATIENT)
Dept: URBAN - METROPOLITAN AREA CLINIC 60 | Facility: CLINIC | Age: 70
End: 2025-03-28

## 2025-03-28 RX ORDER — OMEPRAZOLE 20 MG/1
TAKE 1 CAPSULE BY MOUTH TWICE DAILY CAPSULE, DELAYED RELEASE ORAL ONCE A DAY
Qty: 90 CAPSULES | Refills: 0

## 2025-04-18 ENCOUNTER — TELEPHONE ENCOUNTER (OUTPATIENT)
Dept: URBAN - METROPOLITAN AREA CLINIC 60 | Facility: CLINIC | Age: 70
End: 2025-04-18

## 2025-06-17 ENCOUNTER — ERX REFILL RESPONSE (OUTPATIENT)
Dept: URBAN - METROPOLITAN AREA CLINIC 60 | Facility: CLINIC | Age: 70
End: 2025-06-17

## 2025-06-17 ENCOUNTER — OFFICE VISIT (OUTPATIENT)
Dept: URBAN - METROPOLITAN AREA CLINIC 60 | Facility: CLINIC | Age: 70
End: 2025-06-17
Payer: COMMERCIAL

## 2025-06-17 DIAGNOSIS — K44.9 HIATAL HERNIA: ICD-10-CM

## 2025-06-17 DIAGNOSIS — K21.9 GASTROESOPHAGEAL REFLUX DISEASE WITHOUT ESOPHAGITIS: ICD-10-CM

## 2025-06-17 DIAGNOSIS — K22.4 ESOPHAGEAL DYSMOTILITY: ICD-10-CM

## 2025-06-17 PROBLEM — 266434009: Status: ACTIVE | Noted: 2025-06-17

## 2025-06-17 PROCEDURE — 99214 OFFICE O/P EST MOD 30 MIN: CPT | Performed by: NURSE PRACTITIONER

## 2025-06-17 RX ORDER — ENALAPRIL MALEATE 20 MG/1
TAKE 1 TABLET BY MOUTH EVERY DAY TABLET ORAL
Qty: 90 EACH | Refills: 0 | Status: ACTIVE | COMMUNITY

## 2025-06-17 RX ORDER — ETANERCEPT 50 MG/ML
SOLUTION SUBCUTANEOUS
Qty: 4 MILLILITER | Status: ACTIVE | COMMUNITY

## 2025-06-17 RX ORDER — MECLIZINE HYDROCHLORIDE 25 MG/1
TABLET ORAL
Qty: 30 TABLET | Status: ACTIVE | COMMUNITY

## 2025-06-17 RX ORDER — IBUPROFEN 600 MG/1
TABLET, FILM COATED ORAL
Qty: 30 TABLET | Status: ACTIVE | COMMUNITY

## 2025-06-17 RX ORDER — LOSARTAN POTASSIUM 50 MG/1
TAKE 1 TABLET BY MOUTH DAILY IN THE MORNING TABLET, FILM COATED ORAL
Qty: 90 EACH | Refills: 0 | Status: ACTIVE | COMMUNITY

## 2025-06-17 RX ORDER — FOLIC ACID 1 MG/1
TABLET ORAL
Qty: 90 TABLET | Status: ACTIVE | COMMUNITY

## 2025-06-17 RX ORDER — TIZANIDINE 4 MG/1
TABLET ORAL
Qty: 90 TABLET | Status: ACTIVE | COMMUNITY

## 2025-06-17 RX ORDER — METFORMIN HYDROCHLORIDE 500 MG/1
TAKE 1 TABLET BY MOUTH TWICE DAILY WITH MAIN MEALS TABLET, FILM COATED ORAL
Qty: 180 EACH | Refills: 0 | Status: ACTIVE | COMMUNITY

## 2025-06-17 RX ORDER — PREDNISONE 5 MG/1
TABLET ORAL
Qty: 70 EACH | Refills: 0 | Status: ACTIVE | COMMUNITY

## 2025-06-17 RX ORDER — OMEPRAZOLE 20 MG/1
TAKE 1 CAPSULE BY MOUTH TWICE DAILY CAPSULE, DELAYED RELEASE ORAL
Qty: 180 CAPSULE | Refills: 0

## 2025-06-17 RX ORDER — TRAZODONE HYDROCHLORIDE 50 MG/1
TABLET ORAL
Qty: 90 TABLET | Status: ACTIVE | COMMUNITY

## 2025-06-17 RX ORDER — OMEPRAZOLE 20 MG/1
TAKE 1 CAPSULE BY MOUTH TWICE DAILY CAPSULE, DELAYED RELEASE ORAL ONCE A DAY
Qty: 90 CAPSULES | Refills: 0 | Status: ACTIVE | COMMUNITY

## 2025-06-17 RX ORDER — OXYCODONE HYDROCHLORIDE AND ACETAMINOPHEN 5; 325 MG/1; MG/1
TABLET ORAL
Qty: 10 EACH | Refills: 0 | Status: ACTIVE | COMMUNITY

## 2025-06-17 RX ORDER — OMEPRAZOLE 20 MG/1
TAKE 1 CAPSULE BY MOUTH TWICE DAILY CAPSULE, DELAYED RELEASE ORAL ONCE A DAY
Qty: 90 CAPSULES | Refills: 0
Start: 2025-06-17

## 2025-06-17 RX ORDER — ATORVASTATIN CALCIUM 10 MG/1
TAKE 1 TABLET BY MOUTH DAILY TABLET, FILM COATED ORAL
Qty: 90 EACH | Refills: 0 | Status: ACTIVE | COMMUNITY

## 2025-06-17 RX ORDER — GABAPENTIN 300 MG/1
TAKE 1 CAPSULE BY MOUTH AT BEDTIME CAPSULE ORAL
Qty: 90 EACH | Refills: 0 | Status: ACTIVE | COMMUNITY

## 2025-06-17 RX ORDER — TRIAMCINOLONE ACETONIDE 1 MG/G
APPLY TOPICALLY TO AFFECTED AREA TWICE DAILY CREAM TOPICAL
Qty: 454 GRAM | Refills: 0 | Status: ACTIVE | COMMUNITY

## 2025-06-17 RX ORDER — DICLOFENAC SODIUM 10 MG/G
GEL TOPICAL
Qty: 200 GRAM | Status: ACTIVE | COMMUNITY

## 2025-06-17 RX ORDER — SERTRALINE HYDROCHLORIDE 25 MG/1
TAKE 1 TABLET BY MOUTH DAILY IN THE MORNING TABLET, FILM COATED ORAL
Qty: 90 EACH | Refills: 0 | Status: ACTIVE | COMMUNITY

## 2025-06-17 NOTE — PHYSICAL EXAM CONSTITUTIONAL:
well developed, well nourished , in no acute distress , ambulating without difficulty , normal communication ability MPS 3

## 2025-06-17 NOTE — PHYSICAL EXAM EYES:
Conjunctivae and eyelids appear normal, Sclerae: White without injection or jaundice. Detail Level: Detailed

## 2025-06-17 NOTE — HPI-TODAY'S VISIT:
8/24 Patient is here today with general state.  She is here for her surveillance EGD and colonoscopy.  Patient last colonoscopy was done on 2013 and also her last EGD.  She has medical history of gastric intestinal metaplasia, negative for colon polyps, patient denies any personal family history of colorectal cancer, rectal bleeding or change in her bowel habits. Patient will resume diet for gastritis and reflux, omeprazole 20 mg daily, and  EGD. Colonoscopy  12/24 Patient's colonoscopy shows evidence of diverticular disease of the entire examined colon, internal hemorrhoids, and no specimens were collected. Next colonoscopy will be in 5 year.  EGD demonstrating small hiatal hernia, nonerosive gastritis, esophageal mucosal changes suspicious for short segment of Lerma esophagus, and normal duodenum.  Biopsy results show evidence of duodenal mucosa with no significant abnormality.  Stomach, antrum, body biopsy shows evidence of chronic Helicobacter gastritis, H. pylori organisms were identified.  No evidence of gastric intestinal metaplasia. Lower  Third esophageal mucosa with reflux type changes.  Negative for Lerma esophagus, eosinophilic esophagitis, dysplasia, malignancy.  01/25 Patient is here today in good general state.  She said that she did finish her treatment for H. pylori, her symptoms of gastritis and reflux are better, but still experiences symptoms mostly meal related.  Today she is complaining of having dysphagia, negative for hematemesis or odynophagia.  Patient will continue with treatment for GERD and will have a laboratory to check on persistency of H. pylori organism. Swallow evaluation under fluoroscopy and barium swallow. Diet for dysphagia was discussed with her.  03/25 Patient here today in good general state.  She says symptom of dysphagia studies and reflux are better.  Barium swallow whole demonstrating a normal course and motility close distal follow-up, with no ulceration or mass.  Swallow evaluation under fluoroscopy shows evidence of oropharyngeal swallow function within normal limits.  Positive for flash trace penetration to the level of the mid laryngeal vestibule was also present during the swallowing with thin liquid.  Negative for aspiration. Patient will do diet for dysphagia continue with diet and treatment for GERD. 06/25 Patient is today she has no GI complaints.  Here today in good general state.  Patient states she is doing diet for gastritis, reflux and dysphagia and is asymptomatic at this time.  Patient will continue with diet will DC omeprazole in 4 weeks.